# Patient Record
Sex: FEMALE | Race: BLACK OR AFRICAN AMERICAN | NOT HISPANIC OR LATINO | URBAN - METROPOLITAN AREA
[De-identification: names, ages, dates, MRNs, and addresses within clinical notes are randomized per-mention and may not be internally consistent; named-entity substitution may affect disease eponyms.]

---

## 2023-08-07 ENCOUNTER — NURSE TRIAGE (OUTPATIENT)
Dept: ADMINISTRATIVE | Facility: CLINIC | Age: 28
End: 2023-08-07

## 2023-08-08 NOTE — TELEPHONE ENCOUNTER
"Vaginal bleeding since last week, states going from mild to moderate bleeding, "more than spotting" not "as heavy as a period".  Care advice state to see a provider within 24 hours.  OAC offered and accepted.  Patient verbally understands, all questions answered, advised to call back for any worsening symptoms or further needs.     Reason for Disposition   [1] Intermittent lower abdominal pain (e.g., cramping) AND [2] present > 24 hours    Additional Information   Negative: Shock suspected (e.g., cold/pale/clammy skin, too weak to stand, low BP, rapid pulse)   Negative: Difficult to awaken or acting confused (e.g., disoriented, slurred speech)   Negative: Passed out (i.e., lost consciousness, collapsed and was not responding)   Negative: Sounds like a life-threatening emergency to the triager   Negative: [1] Vaginal bleeding AND [2] pregnant 20 or more weeks   Negative: SEVERE abdominal pain   Negative: SEVERE vaginal bleeding (e.g., soaking 2 pads per hour or large blood clots and present 2 or more hours)   Negative: SEVERE dizziness (e.g., unable to stand, requires support to walk, feels like passing out)   Negative: [1] MODERATE vaginal bleeding (e.g., soaking 1 pad per hour; clots) AND [2] present > 6 hours   Negative: [1] MODERATE vaginal bleeding (e.g., soaking 1 pad per hour; clots) AND [2] pregnant > 12 weeks   Negative: Passed tissue (e.g., gray-white)   Negative: Shoulder pain   Negative: Pale skin (pallor) of new-onset or worsening   Negative: Patient sounds very sick or weak to the triager   Negative: [1] Constant abdominal pain AND [2] present > 2 hours   Negative: Fever 100.4 F (38.0 C) or higher    Protocols used: Pregnancy - Vaginal Bleeding Less Than 20 Weeks EGA-A-AH    "

## 2024-03-27 PROBLEM — E66.01 MORBID OBESITY: Status: ACTIVE | Noted: 2024-03-27

## 2024-03-27 PROBLEM — O24.319 MODIFIED WHITE CLASS B PREGESTATIONAL DIABETES MELLITUS: Status: ACTIVE | Noted: 2024-03-27

## 2024-05-28 PROBLEM — O09.93 SUPERVISION OF HIGH RISK PREGNANCY IN THIRD TRIMESTER: Status: ACTIVE | Noted: 2024-05-28

## 2024-06-11 PROBLEM — O99.013 ANEMIA DURING PREGNANCY IN THIRD TRIMESTER: Status: ACTIVE | Noted: 2024-06-11

## 2024-07-01 ENCOUNTER — NURSE TRIAGE (OUTPATIENT)
Dept: ADMINISTRATIVE | Facility: CLINIC | Age: 29
End: 2024-07-01

## 2024-07-01 PROBLEM — R10.9 ABDOMINAL PAIN AFFECTING PREGNANCY: Status: ACTIVE | Noted: 2024-07-01

## 2024-07-01 PROBLEM — O26.899 ABDOMINAL PAIN AFFECTING PREGNANCY: Status: ACTIVE | Noted: 2024-07-01

## 2024-07-01 NOTE — TELEPHONE ENCOUNTER
"Pt is 33 weeks pregnant, states feels discomfort, some pressure in her vaginal area.  Also with upper abd discomfort - describes as "burning".   No bleeding or leakage of fluid.  Pt states a few days ago she had some vaginal spotting that stopped.  Pt care advice states to go to L&D now.  Patient verbally understands, all questions answered, advised to call back for any worsening symptoms or further needs.     Reason for Disposition   MODERATE-SEVERE abdominal pain    Additional Information   Negative: Passed out (i.e., fainted, collapsed and was not responding)   Negative: Shock suspected (e.g., cold/pale/clammy skin, too weak to stand, low BP, rapid pulse)   Negative: Difficult to awaken or acting confused (e.g., disoriented, slurred speech)   Negative: SEVERE abdominal pain (e.g., excruciating), constant, and present > 1 hour   Negative: SEVERE vaginal bleeding (e.g., continuous red blood from vagina, large blood clots)   Negative: Sounds like a life-threatening emergency to the triager   Having contractions or other symptoms of labor (such as vaginal pressure) and < 37 weeks pregnant (i.e., )   Negative: Passed out (i.e., fainted, collapsed and was not responding)   Negative: Shock suspected (e.g., cold/pale/clammy skin, too weak to stand, low BP, rapid pulse)   Negative: Difficult to awaken or acting confused (e.g., disoriented, slurred speech)   Negative: SEVERE constant abdominal pain (e.g., excruciating) and present > 1 hour   Negative: SEVERE bleeding (e.g., continuous red blood from vagina, or large blood clots)   Negative: Umbilical cord hanging out of the vagina (shiny, white, curled appearance, "like telephone cord")   Negative: Uncontrollable urge to push (i.e., feels like baby is coming out now)   Negative: Can see baby   Negative: Sounds like a life-threatening emergency to the triager   Negative: Pregnant 37 or more weeks (i.e., term)    Protocols used: Pregnancy - Abdominal Pain Greater Than " 20 Weeks EGA-A-OH, Pregnancy - Labor - [id number]-HU

## 2024-07-02 PROBLEM — O36.5990 FETAL GROWTH RESTRICTION ANTEPARTUM: Status: ACTIVE | Noted: 2024-07-02

## 2024-07-16 PROBLEM — O99.019 ANEMIA AFFECTING PREGNANCY, ANTEPARTUM: Status: ACTIVE | Noted: 2024-07-16

## 2024-07-25 PROBLEM — O99.820 GBS (GROUP B STREPTOCOCCUS CARRIER), +RV CULTURE, CURRENTLY PREGNANT: Status: ACTIVE | Noted: 2024-07-25

## 2024-08-03 PROBLEM — O13.3 GESTATIONAL HYPERTENSION, THIRD TRIMESTER: Status: ACTIVE | Noted: 2024-08-03

## 2024-08-04 PROBLEM — O26.899 ABDOMINAL PAIN AFFECTING PREGNANCY: Status: RESOLVED | Noted: 2024-07-01 | Resolved: 2024-08-04

## 2024-08-04 PROBLEM — R10.9 ABDOMINAL PAIN AFFECTING PREGNANCY: Status: RESOLVED | Noted: 2024-07-01 | Resolved: 2024-08-04

## 2024-08-04 PROBLEM — O09.93 SUPERVISION OF HIGH RISK PREGNANCY IN THIRD TRIMESTER: Status: RESOLVED | Noted: 2024-05-28 | Resolved: 2024-08-04

## 2024-08-05 PROBLEM — O41.1290 CHORIOAMNIONITIS: Status: ACTIVE | Noted: 2024-08-05

## 2025-02-14 ENCOUNTER — ON-DEMAND VIRTUAL (OUTPATIENT)
Dept: URGENT CARE | Facility: CLINIC | Age: 30
End: 2025-02-14

## 2025-02-14 DIAGNOSIS — H10.9 CONJUNCTIVITIS, UNSPECIFIED CONJUNCTIVITIS TYPE, UNSPECIFIED LATERALITY: Primary | ICD-10-CM

## 2025-02-14 RX ORDER — POLYMYXIN B SULFATE AND TRIMETHOPRIM 1; 10000 MG/ML; [USP'U]/ML
1 SOLUTION OPHTHALMIC 3 TIMES DAILY
Qty: 10 ML | Refills: 0 | Status: SHIPPED | OUTPATIENT
Start: 2025-02-14 | End: 2025-02-21

## 2025-02-14 NOTE — PROGRESS NOTES
Subjective:      Patient ID: Stacey Clark is a 29 y.o. female.    Vitals:  vitals were not taken for this visit.     Chief Complaint: Eye Problem      Visit Type: TELE AUDIOVISUAL    Patient Location: Home [place], [place]     Present with the patient at the time of consultation: TELEMED PRESENT WITH PATIENT: None    Past Medical History:   Diagnosis Date    Diabetes mellitus, type 2     Rh incompatibility      History reviewed. No pertinent surgical history.  Review of patient's allergies indicates:  No Known Allergies  Current Outpatient Medications on File Prior to Visit   Medication Sig Dispense Refill    albuterol (PROVENTIL/VENTOLIN HFA) 90 mcg/actuation inhaler 2 puffs every 4 (four) hours as needed. (Patient not taking: Reported on 7/23/2024)      blood sugar diagnostic Strp To check Blood Glucose 4 times daily, to use with insurance preferred meter (Patient not taking: Reported on 8/12/2024) 100 each 5    blood-glucose sensor (DEXCOM G7 SENSOR) Brandy 1 Device by Misc.(Non-Drug; Combo Route) route every 10 days. (Patient not taking: Reported on 8/12/2024) 3 each 5    ferrous sulfate 325 (65 FE) MG EC tablet Take 1 tablet (325 mg total) by mouth once daily. (Patient not taking: Reported on 8/12/2024) 30 tablet 11    ibuprofen (ADVIL,MOTRIN) 600 MG tablet Take 1 tablet (600 mg total) by mouth every 6 (six) hours. (Patient not taking: Reported on 8/12/2024) 30 tablet 0    LANTUS SOLOSTAR U-100 INSULIN glargine 100 units/mL SubQ pen Inject 10 Units into the skin every evening.      metFORMIN (GLUCOPHAGE-XR) 500 MG ER 24hr tablet Take 1 tablet (500 mg total) by mouth daily with breakfast. 90 tablet 3    MICRO THIN LANCETS 33 gauge Misc USE TO CHECK BLOOD GLUCOSE FIVE TIMES A DAY (Patient not taking: Reported on 5/28/2024)      prenatal 25/iron fum/folic/dha (PRENATAL-1 ORAL) Take by mouth. (Patient not taking: Reported on 6/18/2024)      prenatal vit no.130-iron-folic (PRENATAL VITAMIN) 27 mg iron- 800 mcg  Tab Take 1 tablet by mouth once daily. (Patient not taking: Reported on 8/12/2024) 30 tablet 24    semaglutide (OZEMPIC) 0.25 mg or 0.5 mg (2 mg/3 mL) pen injector Inject 0.25 mg into the skin every 7 days. After 4 weeks, if able to tolerate well, increase the ozempic to 0.5 mg weekly 6 mL 3    WESTAB PLUS 27 mg iron- 1 mg Tab Take 1 tablet by mouth. (Patient not taking: Reported on 5/1/2024)      WESTGEL DHA 31 mg iron- 1 mg-200 mg Cap Take 1 capsule by mouth. (Patient not taking: Reported on 8/12/2024)       No current facility-administered medications on file prior to visit.     Family History   Problem Relation Name Age of Onset    Diabetes Maternal Grandmother      Hypertension Brother      Hypertension Sister         Medications Ordered                [id number] - [place], [place] - [address] AT [facility] ([place] [address]) &   [address], [facility] [address]    Telephone: [phone]   Fax: [phone]   Hours: Not open 24 hours                         E-Prescribed (1 of 1)              polymyxin B sulf-trimethoprim (POLYTRIM) 10,000 unit- 1 mg/mL Drop    Sig: Place 1 drop into the right eye 3 (three) times daily. for 7 days       Start: 2/14/25     Quantity: 10 mL Refills: 0                           Ohs Peq Odvv Intake    2/14/2025  9:09 AM CST - Filed by Patient   What is your current physical address in the event of a medical emergency? [address], [place], [place], [address]   Are you able to take your vital signs? No   Please attach any relevant images or files    Is your employer contracted with [facility]? No         Pt presents with c/o right eye redness and drainage with itching  x 1 week, tried multisymptom eye drop OTC, not helpful. She denies fever, CP, SOB, vision changes, dizziness, or ha.   Does not wear contact     Eye Problem   The right eye is affected. This is a new problem. The current episode started 1 to 4 weeks ago. The  problem occurs constantly. The problem has been gradually worsening. She Does not wear contacts. Associated symptoms include an eye discharge, eye redness and itching. Pertinent negatives include no blurred vision, double vision, fever or foreign body sensation. She has tried eye drops and water for the symptoms. The treatment provided no relief.       Constitution: Negative for fever.   Cardiovascular:  Negative for chest pain.   Eyes:  Positive for eye discharge, eye itching and eye redness. Negative for double vision and blurred vision.   Respiratory:  Negative for cough and shortness of breath.    Neurological:  Negative for dizziness and headaches.        Objective:   The physical exam was conducted virtually.  Physical Exam   Constitutional: She is oriented to person, place, and time. No distress.   HENT:   Head: Normocephalic.   Ears:   Right Ear: External ear normal.   Eyes: Right eye exhibits discharge. Right conjunctiva is injected.   Neck: Neck supple.   Pulmonary/Chest: Effort normal.   Neurological: She is alert and oriented to person, place, and time.       Assessment:     1. Conjunctivitis, unspecified conjunctivitis type, unspecified laterality        Plan:   Use drops as directed    Replace mascara     If worsening or new symptoms f/u for in person visit with Local urgent Care or Eye doctors    Conjunctivitis, unspecified conjunctivitis type, unspecified laterality  -     polymyxin B sulf-trimethoprim (POLYTRIM) 10,000 unit- 1 mg/mL Drop; Place 1 drop into the right eye 3 (three) times daily. for 7 days  Dispense: 10 mL; Refill: 0      We appreciate you trusting us with your medical care. We hope you feel better soon. We will be happy to take care of you for all of your future medical needs.     You must understand that you've received Virtual treatment only and that you may be released before all your medical problems are known or treated. You, the patient, will arrange for follow up care as  instructed.     Follow up with your PCP or specialty clinic as directed in the next 1-2 weeks if not improved or as needed. You can call [phone] to schedule an appointment with the appropriate provider.     If your condition worsens we recommend that you receive another evaluation in person, with your primary care provider, urgent care or at the emergency room immediately or contact your primary medical clinics after hours call service to discuss your concerns.

## 2025-02-14 NOTE — PATIENT INSTRUCTIONS
Use drops as directed    Replace mascara     If worsening or new symptoms f/u for in person visit with Local urgent Care or Eye doctors    We appreciate you trusting us with your medical care. We hope you feel better soon. We will be happy to take care of you for all of your future medical needs.     You must understand that you've received Virtual treatment only and that you may be released before all your medical problems are known or treated. You, the patient, will arrange for follow up care as instructed.     Follow up with your PCP or specialty clinic as directed in the next 1-2 weeks if not improved or as needed. You can call [phone] to schedule an appointment with the appropriate provider.     If your condition worsens we recommend that you receive another evaluation in person, with your primary care provider, urgent care or at the emergency room immediately or contact your primary medical clinics after hours call service to discuss your concerns.